# Patient Record
(demographics unavailable — no encounter records)

---

## 2024-11-12 NOTE — PLAN
[TextEntry] : Bilateral screening mammogram due in November 2025 Patient to continue to follow-up with medical oncology Patient to follow-up with Dr. Lerman in March 2025 Patient to perform self-breast exams as instructed in the office. Patient to follow-up in 1 year after imaging completed.

## 2024-11-12 NOTE — DATA REVIEWED
[FreeTextEntry1] : -- 10/27/20 ([facility]) left mmg and US: Benign findings, unremarkable US. BI-RADS 2.   10/27/20 ([facility]) left mmg and US: Benign findings, unremarkable US. BI-RADS 2.   12/10/2020 ([facility]) bilateral breast MRI showing RIGHT RECONSTRUCTED BREAST: No evidence of recurrence. LEFT BREAST: 0.5 cm enhancing lesion 10:00 position, 6.5 cm from the nipple in the region of architectural distortion from previous lumpectomy with suspicious enhancement kinetics. While this could represent fat necrosis, pathology reported fibroadenoma on final pathology and not a papilloma. Biopsy is recommended. Targeted ultrasound can be performed to determine if it is amenable to ultrasound-guided biopsy. Otherwise, MRI biopsy is recommended. 2.1.9 cm left axillary lymph node with possible mild cortical thickening to 0.5 cm versus 2 adjacent lymph nodes. However, it appears more prominent than on the prior study. Targeted ultrasound, possible ultrasound-guided biopsy is recommended. RECOMMENDATION: Ultrasound-guided or MR guided biopsy. BI-RADS 4A  1/19/2021 patient cancelled US and US core biopsy appointment?   5/27/2021 ([facility]) left breast US showing 1. 0.5 cm ill-defined hypoechoic lesion with possible internal vascularity 10:00, 6.5 cm from the nipple.2. 2.0 cm left axillary lymph node with cortical thickening measuring up to 0.5 cm. Ultrasound-guided biopsy of both areas is recommended. This was discussed with the patient and she was scheduled for biopsy on 6/15/2021.RECOMMENDATION: Ultrasound biopsy. BI-RADS 4B  6/15/2021 ([facility]) left breast US core biopsy x 2 pathology: 1. Breast, left, 10 o'clock; ultrasound-guided biopsy: - Breast tissue with fibrosis and foreign body type giant cell reaction, consistent with changes of prior procedure 2. Axilla, left, lymph node; biopsy: - Lymph node with reactive follicular hyperplasia, see note  10/19/21 ([facility]) left sMmg & US: scattered fbg density. No e/o malignancy. BI-RADS 2.   12/1/21 ([facility]) MRI: no e/o malignancy. BI-RADS 2.   10/20/2022 ([facility]) left screening mammogram/US showing scattered areas of fibroglandular density, No mammographic or sonographic evidence of malignancy. RECOMMENDATION: Mammography in 1 year. BIRADS 2   10/2/23 ([facility]) left screening mammogram/US: scattered areas of fibroglandular density, No mammographic or sonographic evidence of malignancy. RECOMMENDATION: Mammography in 1 year. BI-RADS 2 - Benign Finding

## 2024-11-12 NOTE — HISTORY OF PRESENT ILLNESS
[FreeTextEntry1] : 68 yo female presents for follow up with a history of right breast cancer metastatic to the axilla s/p NAC, right mastectomy, ALND on 3/12/2018.  She has some complaints of right breast lateral tenderness, states improvement with warm compresses and massage. Denies palpable abnormalities, no skin changes/dimpling, no nipple discharge bilaterally.  She did mention that she is interested in undergoing reconstruction of the left breast in order to create more symmetry.  She would like to follow-up with Dr. Lerman for reconstructive options in March of next year.  Recent breast imaging with left breast screening mammogram and sonogram performed on 10-23 returned as benign as seen below.  Of note, she has been getting cortisone injections in right wrist and shoulder and is undergoing PT, she is followed by Dr. Cheng (Orthopedics). She is a  for a living and reports strenuous physical activity requirements at work.  She had been seen by Dr. Savanah Salvador which she is no longer following with.  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 Patient sees Dr. Puentes, medical oncology, was taking Anastrazole, but switched to Exemestane.

## 2024-11-12 NOTE — PAST MEDICAL HISTORY
[Menarche Age ____] : age at menarche was [unfilled] [Menopause Age____] : age at menopause was [unfilled] [Total Preg ___] : G[unfilled] [Live Births ___] : P[unfilled]  [Age At Live Birth ___] : Age at live birth: [unfilled] [History of Hormone Replacement Treatment] : has no history of hormone replacement treatment [FreeTextEntry6] : No [FreeTextEntry7] : Yes [FreeTextEntry8] : Yes

## 2024-11-12 NOTE — PHYSICAL EXAM
[Normocephalic] : normocephalic [EOMI] : extra ocular movement intact [Supple] : supple [Examined in the supine and seated position] : examined in the supine and seated position [No dominant masses] : no dominant masses in right breast  [No dominant masses] : no dominant masses left breast [No Nipple Retraction] : no left nipple retraction [No Nipple Discharge] : no left nipple discharge [No Axillary Lymphadenopathy] : no left axillary lymphadenopathy [No Rashes] : no rashes [Atraumatic] : atraumatic [No Supraclavicular Adenopathy] : no supraclavicular adenopathy [No Edema] : no edema [No Ulceration] : no ulceration [de-identified] : Well-healed mastectomy incision